# Patient Record
Sex: MALE | Race: WHITE | NOT HISPANIC OR LATINO | Employment: FULL TIME | URBAN - METROPOLITAN AREA
[De-identification: names, ages, dates, MRNs, and addresses within clinical notes are randomized per-mention and may not be internally consistent; named-entity substitution may affect disease eponyms.]

---

## 2023-04-25 DIAGNOSIS — F32.A DEPRESSION, UNSPECIFIED DEPRESSION TYPE: Primary | ICD-10-CM

## 2023-04-25 DIAGNOSIS — E78.2 MIXED HYPERLIPIDEMIA: ICD-10-CM

## 2023-04-25 RX ORDER — ATORVASTATIN CALCIUM 80 MG/1
80 TABLET, FILM COATED ORAL DAILY
COMMUNITY
End: 2023-04-25 | Stop reason: SDUPTHER

## 2023-04-25 RX ORDER — ATORVASTATIN CALCIUM 80 MG/1
80 TABLET, FILM COATED ORAL DAILY
Qty: 90 TABLET | Refills: 1 | Status: SHIPPED | OUTPATIENT
Start: 2023-04-25 | End: 2024-01-17

## 2023-04-25 RX ORDER — FLUOXETINE HYDROCHLORIDE 40 MG/1
40 CAPSULE ORAL DAILY
COMMUNITY
End: 2023-04-25 | Stop reason: SDUPTHER

## 2023-04-25 RX ORDER — FLUOXETINE HYDROCHLORIDE 40 MG/1
40 CAPSULE ORAL DAILY
Qty: 90 CAPSULE | Refills: 1 | Status: SHIPPED | OUTPATIENT
Start: 2023-04-25 | End: 2024-01-17

## 2023-04-28 PROBLEM — M25.562 ARTHRALGIA OF BOTH KNEES: Status: ACTIVE | Noted: 2023-04-28

## 2023-04-28 PROBLEM — F32.A DEPRESSION: Status: ACTIVE | Noted: 2023-04-28

## 2023-04-28 PROBLEM — M25.519 SHOULDER PAIN: Status: ACTIVE | Noted: 2023-04-28

## 2023-04-28 PROBLEM — M25.561 ARTHRALGIA OF BOTH KNEES: Status: ACTIVE | Noted: 2023-04-28

## 2023-04-28 PROBLEM — H93.8X9 EAR PRESSURE: Status: ACTIVE | Noted: 2023-04-28

## 2023-04-28 PROBLEM — J34.89 SINUS PRESSURE: Status: ACTIVE | Noted: 2023-04-28

## 2023-04-28 PROBLEM — M54.2 NECK PAIN: Status: ACTIVE | Noted: 2023-04-28

## 2023-04-28 PROBLEM — G47.33 OSA (OBSTRUCTIVE SLEEP APNEA): Status: ACTIVE | Noted: 2023-04-28

## 2023-04-28 PROBLEM — R53.83 FATIGUE: Status: ACTIVE | Noted: 2023-04-28

## 2023-04-28 PROBLEM — R97.20 ELEVATED PSA: Status: ACTIVE | Noted: 2023-04-28

## 2023-04-28 PROBLEM — M54.12 CERVICAL RADICULOPATHY: Status: ACTIVE | Noted: 2023-04-28

## 2023-04-28 PROBLEM — E78.00 HYPERCHOLESTEROLEMIA: Status: ACTIVE | Noted: 2023-04-28

## 2023-04-28 PROBLEM — M25.511 SHOULDER PAIN, RIGHT: Status: ACTIVE | Noted: 2023-04-28

## 2023-04-28 PROBLEM — E29.1 TESTICULAR HYPOFUNCTION: Status: ACTIVE | Noted: 2023-04-28

## 2023-04-28 PROBLEM — I63.9 STROKE (MULTI): Status: ACTIVE | Noted: 2023-04-28

## 2023-04-28 PROBLEM — K21.9 GERD (GASTROESOPHAGEAL REFLUX DISEASE): Status: ACTIVE | Noted: 2023-04-28

## 2023-04-28 PROBLEM — E34.9 HYPOTESTOSTERONISM: Status: ACTIVE | Noted: 2023-04-28

## 2023-04-28 PROBLEM — F10.20 ALCOHOLISM (MULTI): Status: ACTIVE | Noted: 2023-04-28

## 2023-04-28 RX ORDER — PANTOPRAZOLE SODIUM 40 MG/1
1 TABLET, DELAYED RELEASE ORAL DAILY
COMMUNITY
Start: 2023-02-10 | End: 2023-05-25 | Stop reason: SDUPTHER

## 2023-04-28 RX ORDER — NAPROXEN SODIUM 220 MG
1 TABLET ORAL DAILY
COMMUNITY
Start: 2020-05-27

## 2023-04-28 RX ORDER — FLUTICASONE PROPIONATE 50 MCG
1 SPRAY, SUSPENSION (ML) NASAL DAILY
COMMUNITY
Start: 2016-05-13 | End: 2023-05-01 | Stop reason: ALTCHOICE

## 2023-04-28 RX ORDER — TESTOSTERONE 50 MG/5G
GEL TRANSDERMAL
COMMUNITY
Start: 2014-03-20 | End: 2024-01-10 | Stop reason: SDUPTHER

## 2023-04-28 RX ORDER — SILDENAFIL 100 MG/1
TABLET, FILM COATED ORAL
COMMUNITY
Start: 2023-02-10 | End: 2024-05-02 | Stop reason: SDUPTHER

## 2023-05-01 ENCOUNTER — OFFICE VISIT (OUTPATIENT)
Dept: PRIMARY CARE | Facility: CLINIC | Age: 58
End: 2023-05-01
Payer: COMMERCIAL

## 2023-05-01 ENCOUNTER — LAB (OUTPATIENT)
Dept: LAB | Facility: LAB | Age: 58
End: 2023-05-01
Payer: COMMERCIAL

## 2023-05-01 VITALS
HEART RATE: 65 BPM | SYSTOLIC BLOOD PRESSURE: 140 MMHG | BODY MASS INDEX: 26.53 KG/M2 | RESPIRATION RATE: 16 BRPM | HEIGHT: 67 IN | DIASTOLIC BLOOD PRESSURE: 70 MMHG | OXYGEN SATURATION: 97 % | TEMPERATURE: 97.6 F | WEIGHT: 169 LBS

## 2023-05-01 DIAGNOSIS — F32.A DEPRESSION, UNSPECIFIED DEPRESSION TYPE: ICD-10-CM

## 2023-05-01 DIAGNOSIS — E29.1 TESTICULAR HYPOFUNCTION: ICD-10-CM

## 2023-05-01 DIAGNOSIS — R35.0 URINARY FREQUENCY: ICD-10-CM

## 2023-05-01 DIAGNOSIS — E29.1 HYPOGONADISM IN MALE: ICD-10-CM

## 2023-05-01 DIAGNOSIS — Z72.0 TOBACCO ABUSE: ICD-10-CM

## 2023-05-01 DIAGNOSIS — Z00.00 HEALTHCARE MAINTENANCE: ICD-10-CM

## 2023-05-01 DIAGNOSIS — Z00.00 HEALTHCARE MAINTENANCE: Primary | ICD-10-CM

## 2023-05-01 PROBLEM — M25.562 ARTHRALGIA OF BOTH KNEES: Status: RESOLVED | Noted: 2023-04-28 | Resolved: 2023-05-01

## 2023-05-01 PROBLEM — J34.89 SINUS PRESSURE: Status: RESOLVED | Noted: 2023-04-28 | Resolved: 2023-05-01

## 2023-05-01 PROBLEM — M25.519 SHOULDER PAIN: Status: RESOLVED | Noted: 2023-04-28 | Resolved: 2023-05-01

## 2023-05-01 PROBLEM — M54.2 NECK PAIN: Status: RESOLVED | Noted: 2023-04-28 | Resolved: 2023-05-01

## 2023-05-01 PROBLEM — M25.511 SHOULDER PAIN, RIGHT: Status: RESOLVED | Noted: 2023-04-28 | Resolved: 2023-05-01

## 2023-05-01 PROBLEM — H93.8X9 EAR PRESSURE: Status: RESOLVED | Noted: 2023-04-28 | Resolved: 2023-05-01

## 2023-05-01 PROBLEM — E34.9 HYPOTESTOSTERONISM: Status: RESOLVED | Noted: 2023-04-28 | Resolved: 2023-05-01

## 2023-05-01 PROBLEM — M25.561 ARTHRALGIA OF BOTH KNEES: Status: RESOLVED | Noted: 2023-04-28 | Resolved: 2023-05-01

## 2023-05-01 PROBLEM — R53.83 FATIGUE: Status: RESOLVED | Noted: 2023-04-28 | Resolved: 2023-05-01

## 2023-05-01 PROBLEM — F10.20 ALCOHOLISM (MULTI): Status: RESOLVED | Noted: 2023-04-28 | Resolved: 2023-05-01

## 2023-05-01 LAB
ALANINE AMINOTRANSFERASE (SGPT) (U/L) IN SER/PLAS: 23 U/L (ref 10–52)
ALBUMIN (G/DL) IN SER/PLAS: 4.5 G/DL (ref 3.4–5)
ALKALINE PHOSPHATASE (U/L) IN SER/PLAS: 61 U/L (ref 33–120)
ANION GAP IN SER/PLAS: 9 MMOL/L (ref 10–20)
APPEARANCE, URINE: CLEAR
ASPARTATE AMINOTRANSFERASE (SGOT) (U/L) IN SER/PLAS: 20 U/L (ref 9–39)
BASOPHILS (10*3/UL) IN BLOOD BY AUTOMATED COUNT: 0.1 X10E9/L (ref 0–0.1)
BASOPHILS/100 LEUKOCYTES IN BLOOD BY AUTOMATED COUNT: 1.8 % (ref 0–2)
BILIRUBIN TOTAL (MG/DL) IN SER/PLAS: 0.5 MG/DL (ref 0–1.2)
BILIRUBIN, URINE: NEGATIVE
BLOOD, URINE: NEGATIVE
CALCIUM (MG/DL) IN SER/PLAS: 9.7 MG/DL (ref 8.6–10.3)
CARBON DIOXIDE, TOTAL (MMOL/L) IN SER/PLAS: 29 MMOL/L (ref 21–32)
CHLORIDE (MMOL/L) IN SER/PLAS: 102 MMOL/L (ref 98–107)
CHOLESTEROL (MG/DL) IN SER/PLAS: 185 MG/DL (ref 0–199)
CHOLESTEROL IN HDL (MG/DL) IN SER/PLAS: 52.6 MG/DL
CHOLESTEROL/HDL RATIO: 3.5
COLOR, URINE: YELLOW
CREATININE (MG/DL) IN SER/PLAS: 0.89 MG/DL (ref 0.5–1.3)
EOSINOPHILS (10*3/UL) IN BLOOD BY AUTOMATED COUNT: 0.25 X10E9/L (ref 0–0.7)
EOSINOPHILS/100 LEUKOCYTES IN BLOOD BY AUTOMATED COUNT: 4.5 % (ref 0–6)
ERYTHROCYTE DISTRIBUTION WIDTH (RATIO) BY AUTOMATED COUNT: 12.7 % (ref 11.5–14.5)
ERYTHROCYTE MEAN CORPUSCULAR HEMOGLOBIN CONCENTRATION (G/DL) BY AUTOMATED: 32.3 G/DL (ref 32–36)
ERYTHROCYTE MEAN CORPUSCULAR VOLUME (FL) BY AUTOMATED COUNT: 91 FL (ref 80–100)
ERYTHROCYTES (10*6/UL) IN BLOOD BY AUTOMATED COUNT: 4.39 X10E12/L (ref 4.5–5.9)
GFR MALE: >90 ML/MIN/1.73M2
GLUCOSE (MG/DL) IN SER/PLAS: 92 MG/DL (ref 74–99)
GLUCOSE, URINE: NEGATIVE MG/DL
HEMATOCRIT (%) IN BLOOD BY AUTOMATED COUNT: 39.9 % (ref 41–52)
HEMOGLOBIN (G/DL) IN BLOOD: 12.9 G/DL (ref 13.5–17.5)
IMMATURE GRANULOCYTES/100 LEUKOCYTES IN BLOOD BY AUTOMATED COUNT: 0.2 % (ref 0–0.9)
KETONES, URINE: NEGATIVE MG/DL
LDL: 107 MG/DL (ref 0–99)
LEUKOCYTE ESTERASE, URINE: NEGATIVE
LEUKOCYTES (10*3/UL) IN BLOOD BY AUTOMATED COUNT: 5.6 X10E9/L (ref 4.4–11.3)
LYMPHOCYTES (10*3/UL) IN BLOOD BY AUTOMATED COUNT: 1.64 X10E9/L (ref 1.2–4.8)
LYMPHOCYTES/100 LEUKOCYTES IN BLOOD BY AUTOMATED COUNT: 29.5 % (ref 13–44)
MONOCYTES (10*3/UL) IN BLOOD BY AUTOMATED COUNT: 0.5 X10E9/L (ref 0.1–1)
MONOCYTES/100 LEUKOCYTES IN BLOOD BY AUTOMATED COUNT: 9 % (ref 2–10)
NEUTROPHILS (10*3/UL) IN BLOOD BY AUTOMATED COUNT: 3.05 X10E9/L (ref 1.2–7.7)
NEUTROPHILS/100 LEUKOCYTES IN BLOOD BY AUTOMATED COUNT: 55 % (ref 40–80)
NITRITE, URINE: NEGATIVE
PH, URINE: 6 (ref 5–8)
PLATELETS (10*3/UL) IN BLOOD AUTOMATED COUNT: 166 X10E9/L (ref 150–450)
POTASSIUM (MMOL/L) IN SER/PLAS: 4 MMOL/L (ref 3.5–5.3)
PROSTATE SPECIFIC AG (NG/ML) IN SER/PLAS: 1.95 NG/ML (ref 0–4)
PROTEIN TOTAL: 7.6 G/DL (ref 6.4–8.2)
PROTEIN, URINE: NEGATIVE MG/DL
SODIUM (MMOL/L) IN SER/PLAS: 136 MMOL/L (ref 136–145)
SPECIFIC GRAVITY, URINE: 1.02 (ref 1–1.03)
TRIGLYCERIDE (MG/DL) IN SER/PLAS: 126 MG/DL (ref 0–149)
UREA NITROGEN (MG/DL) IN SER/PLAS: 16 MG/DL (ref 6–23)
UROBILINOGEN, URINE: <2 MG/DL (ref 0–1.9)
VLDL: 25 MG/DL (ref 0–40)

## 2023-05-01 PROCEDURE — 84403 ASSAY OF TOTAL TESTOSTERONE: CPT

## 2023-05-01 PROCEDURE — 85025 COMPLETE CBC W/AUTO DIFF WBC: CPT

## 2023-05-01 PROCEDURE — 84402 ASSAY OF FREE TESTOSTERONE: CPT

## 2023-05-01 PROCEDURE — 99396 PREV VISIT EST AGE 40-64: CPT | Performed by: INTERNAL MEDICINE

## 2023-05-01 PROCEDURE — 80061 LIPID PANEL: CPT

## 2023-05-01 PROCEDURE — 93000 ELECTROCARDIOGRAM COMPLETE: CPT | Performed by: INTERNAL MEDICINE

## 2023-05-01 PROCEDURE — 81003 URINALYSIS AUTO W/O SCOPE: CPT

## 2023-05-01 PROCEDURE — 80053 COMPREHEN METABOLIC PANEL: CPT

## 2023-05-01 PROCEDURE — 84153 ASSAY OF PSA TOTAL: CPT

## 2023-05-01 PROCEDURE — 36415 COLL VENOUS BLD VENIPUNCTURE: CPT

## 2023-05-01 RX ORDER — TESTOSTERONE 50 MG/5G
50 GEL TRANSDERMAL
Qty: 2 PACKET | Refills: 1 | Status: CANCELLED | OUTPATIENT
Start: 2023-05-01

## 2023-05-01 RX ORDER — BUPROPION HYDROCHLORIDE 150 MG/1
150 TABLET, EXTENDED RELEASE ORAL 2 TIMES DAILY
Qty: 60 TABLET | Refills: 1 | Status: SHIPPED | OUTPATIENT
Start: 2023-05-01 | End: 2024-05-02 | Stop reason: ALTCHOICE

## 2023-05-01 ASSESSMENT — ENCOUNTER SYMPTOMS
CONSTIPATION: 0
ACTIVITY CHANGE: 1
DIFFICULTY URINATING: 1
SHORTNESS OF BREATH: 0
FREQUENCY: 1
DIARRHEA: 0

## 2023-05-01 ASSESSMENT — PATIENT HEALTH QUESTIONNAIRE - PHQ9
SUM OF ALL RESPONSES TO PHQ9 QUESTIONS 1 AND 2: 0
2. FEELING DOWN, DEPRESSED OR HOPELESS: NOT AT ALL
1. LITTLE INTEREST OR PLEASURE IN DOING THINGS: NOT AT ALL

## 2023-05-01 NOTE — PROGRESS NOTES
Patient here for a physical - declined chaperone     Subjective   Patient ID: Pancho King is a 58 y.o. male who presents for Annual Exam.    The patient reports he is trying to cut down on cigarettes, but finds himself consuming more sugar in candy and cough drops instead.  He notes ongoing sinus and nasal congestion which he believes is related to smoking.  He has tried fluticasone, but discontinued as it was not helping.  The patient denies dyspnea.    The patient reports notices urinary frequency and mild urinary incontinence at night time, particularly after consuming beer or coffee.  He drinks about one 12 pack of beer on the weekends, but otherwise rarely drinks during the week.    The patient continues to take Androgel 50mg/5gm, although inconsistently.  He notes fatigue and low energy, particularly when he is not taking this medication.     The patient denies any bowel problems.        Review of Systems   Constitutional:  Positive for activity change.   HENT:  Positive for congestion.    Respiratory:  Negative for shortness of breath.    Gastrointestinal:  Negative for constipation and diarrhea.   Genitourinary:  Positive for difficulty urinating and frequency.        Positive for mild urinary incontinence.       Objective   Physical Exam  Constitutional:       Appearance: Normal appearance.   Cardiovascular:      Rate and Rhythm: Normal rate and regular rhythm.      Heart sounds: Normal heart sounds.   Pulmonary:      Effort: Pulmonary effort is normal.      Breath sounds: Normal breath sounds.   Abdominal:      General: Bowel sounds are normal.      Palpations: Abdomen is soft.      Tenderness: There is no abdominal tenderness.   Skin:     General: Skin is warm and dry.   Neurological:      General: No focal deficit present.      Mental Status: He is alert and oriented to person, place, and time. Mental status is at baseline.   Psychiatric:         Mood and Affect: Mood normal.         Behavior:  Behavior normal.       Assessment/Plan       CPE Performed  -  Discussed healthy diet and regular exercise.    -  Physical exam overall unremarkable. Immunizations reviewed and updated accordingly. Healthy lifestyle choices discussed (tobacco avoidance, appropriate alcohol use, avoidance of illicit substances).   -  Patient is wearing seatbelt.   -  Screening lab work ordered as indicated.    -  Age appropriate screening tests reviewed with patient.       IMPRESSIONS/PLAN:     Depression   - Stable. Start bupropion 150mg XL QD and advised patient that drinking alcohol will lower seizure threshold and he verbalized understanding.  Continue on fluoxetine 40mg QD, report any changes in mood or side effects. Advised patient to continue taking this medication for a little longer. If worsening depression does not subside, will increase to 60 mg.    Urinary Frequency  -Ordered Urinalysis with reflex microscopy.    Tobacco Use  - Prescribed bupropion as above to assist with smoking cessation.  Ordered Low Dose CT Chest for lung screening.    /70 in the office today.    Hypercholesterolemia  - Continue on atorvastatin 80mg QD.      Testicular Hypofunction  - Continue on Androgel 50mg/5gm as prescribed.  Ordered testosterone free and total.    GERD   - Continue on pantoprazole 40mg QD as needed. Last EGD 10/2022.     ED   - Continue on sildenafil 100mg 1 hour before sexual activity.      History TIA   - Stable.      Health Maintenance   - Routine labs ordered including CBC, CMP, and a lipid panel to be completed in the fasting state.  Added PSA. Last colonoscopy 10/2022, due for repeat 10/2027.     Follow up for annual physical examination, call sooner if needed.         Scribe Attestation  By signing my name below, IZuri Scribe   attest that this documentation has been prepared under the direction and in the presence of Mark Franklin DO.

## 2023-05-04 DIAGNOSIS — Z00.00 HEALTHCARE MAINTENANCE: Primary | ICD-10-CM

## 2023-05-11 ENCOUNTER — LAB (OUTPATIENT)
Dept: LAB | Facility: LAB | Age: 58
End: 2023-05-11
Payer: COMMERCIAL

## 2023-05-11 DIAGNOSIS — Z00.00 HEALTHCARE MAINTENANCE: ICD-10-CM

## 2023-05-11 LAB
BASOPHILS (10*3/UL) IN BLOOD BY AUTOMATED COUNT: 0.07 X10E9/L (ref 0–0.1)
BASOPHILS/100 LEUKOCYTES IN BLOOD BY AUTOMATED COUNT: 1.8 % (ref 0–2)
EOSINOPHILS (10*3/UL) IN BLOOD BY AUTOMATED COUNT: 0.16 X10E9/L (ref 0–0.7)
EOSINOPHILS/100 LEUKOCYTES IN BLOOD BY AUTOMATED COUNT: 4.1 % (ref 0–6)
ERYTHROCYTE DISTRIBUTION WIDTH (RATIO) BY AUTOMATED COUNT: 12.8 % (ref 11.5–14.5)
ERYTHROCYTE MEAN CORPUSCULAR HEMOGLOBIN CONCENTRATION (G/DL) BY AUTOMATED: 32.2 G/DL (ref 32–36)
ERYTHROCYTE MEAN CORPUSCULAR VOLUME (FL) BY AUTOMATED COUNT: 92 FL (ref 80–100)
ERYTHROCYTES (10*6/UL) IN BLOOD BY AUTOMATED COUNT: 4.37 X10E12/L (ref 4.5–5.9)
HEMATOCRIT (%) IN BLOOD BY AUTOMATED COUNT: 40.4 % (ref 41–52)
HEMOGLOBIN (G/DL) IN BLOOD: 13 G/DL (ref 13.5–17.5)
IMMATURE GRANULOCYTES/100 LEUKOCYTES IN BLOOD BY AUTOMATED COUNT: 0 % (ref 0–0.9)
LEUKOCYTES (10*3/UL) IN BLOOD BY AUTOMATED COUNT: 3.9 X10E9/L (ref 4.4–11.3)
LYMPHOCYTES (10*3/UL) IN BLOOD BY AUTOMATED COUNT: 0.97 X10E9/L (ref 1.2–4.8)
LYMPHOCYTES/100 LEUKOCYTES IN BLOOD BY AUTOMATED COUNT: 25.1 % (ref 13–44)
MONOCYTES (10*3/UL) IN BLOOD BY AUTOMATED COUNT: 0.4 X10E9/L (ref 0.1–1)
MONOCYTES/100 LEUKOCYTES IN BLOOD BY AUTOMATED COUNT: 10.3 % (ref 2–10)
NEUTROPHILS (10*3/UL) IN BLOOD BY AUTOMATED COUNT: 2.27 X10E9/L (ref 1.2–7.7)
NEUTROPHILS/100 LEUKOCYTES IN BLOOD BY AUTOMATED COUNT: 58.7 % (ref 40–80)
PLATELETS (10*3/UL) IN BLOOD AUTOMATED COUNT: 165 X10E9/L (ref 150–450)
TESTOSTERONE FREE (CHAN): 62.4 PG/ML (ref 35–155)
TESTOSTERONE,TOTAL,LC-MS/MS: 430 NG/DL (ref 250–1100)

## 2023-05-11 PROCEDURE — 85025 COMPLETE CBC W/AUTO DIFF WBC: CPT

## 2023-05-11 PROCEDURE — 36415 COLL VENOUS BLD VENIPUNCTURE: CPT

## 2023-05-18 DIAGNOSIS — Z00.00 HEALTHCARE MAINTENANCE: Primary | ICD-10-CM

## 2023-05-25 DIAGNOSIS — K21.9 GASTROESOPHAGEAL REFLUX DISEASE WITHOUT ESOPHAGITIS: Primary | ICD-10-CM

## 2023-05-25 RX ORDER — PANTOPRAZOLE SODIUM 40 MG/1
40 TABLET, DELAYED RELEASE ORAL DAILY
Qty: 90 TABLET | Refills: 2 | Status: SHIPPED | OUTPATIENT
Start: 2023-05-25 | End: 2024-03-14

## 2023-06-16 ENCOUNTER — LAB (OUTPATIENT)
Dept: LAB | Facility: LAB | Age: 58
End: 2023-06-16
Payer: COMMERCIAL

## 2023-06-16 DIAGNOSIS — Z00.00 HEALTHCARE MAINTENANCE: ICD-10-CM

## 2023-06-16 LAB
BASOPHILS (10*3/UL) IN BLOOD BY AUTOMATED COUNT: 0.06 X10E9/L (ref 0–0.1)
BASOPHILS/100 LEUKOCYTES IN BLOOD BY AUTOMATED COUNT: 1.1 % (ref 0–2)
EOSINOPHILS (10*3/UL) IN BLOOD BY AUTOMATED COUNT: 0.15 X10E9/L (ref 0–0.7)
EOSINOPHILS/100 LEUKOCYTES IN BLOOD BY AUTOMATED COUNT: 2.7 % (ref 0–6)
ERYTHROCYTE DISTRIBUTION WIDTH (RATIO) BY AUTOMATED COUNT: 13.3 % (ref 11.5–14.5)
ERYTHROCYTE MEAN CORPUSCULAR HEMOGLOBIN CONCENTRATION (G/DL) BY AUTOMATED: 32.1 G/DL (ref 32–36)
ERYTHROCYTE MEAN CORPUSCULAR VOLUME (FL) BY AUTOMATED COUNT: 90 FL (ref 80–100)
ERYTHROCYTES (10*6/UL) IN BLOOD BY AUTOMATED COUNT: 4.37 X10E12/L (ref 4.5–5.9)
HEMATOCRIT (%) IN BLOOD BY AUTOMATED COUNT: 39.2 % (ref 41–52)
HEMOGLOBIN (G/DL) IN BLOOD: 12.6 G/DL (ref 13.5–17.5)
IMMATURE GRANULOCYTES/100 LEUKOCYTES IN BLOOD BY AUTOMATED COUNT: 0.4 % (ref 0–0.9)
LEUKOCYTES (10*3/UL) IN BLOOD BY AUTOMATED COUNT: 5.7 X10E9/L (ref 4.4–11.3)
LYMPHOCYTES (10*3/UL) IN BLOOD BY AUTOMATED COUNT: 1.62 X10E9/L (ref 1.2–4.8)
LYMPHOCYTES/100 LEUKOCYTES IN BLOOD BY AUTOMATED COUNT: 28.6 % (ref 13–44)
MONOCYTES (10*3/UL) IN BLOOD BY AUTOMATED COUNT: 0.69 X10E9/L (ref 0.1–1)
MONOCYTES/100 LEUKOCYTES IN BLOOD BY AUTOMATED COUNT: 12.2 % (ref 2–10)
NEUTROPHILS (10*3/UL) IN BLOOD BY AUTOMATED COUNT: 3.12 X10E9/L (ref 1.2–7.7)
NEUTROPHILS/100 LEUKOCYTES IN BLOOD BY AUTOMATED COUNT: 55 % (ref 40–80)
PLATELETS (10*3/UL) IN BLOOD AUTOMATED COUNT: 146 X10E9/L (ref 150–450)

## 2023-06-16 PROCEDURE — 85025 COMPLETE CBC W/AUTO DIFF WBC: CPT

## 2023-06-16 PROCEDURE — 36415 COLL VENOUS BLD VENIPUNCTURE: CPT

## 2023-06-20 DIAGNOSIS — D64.9 ANEMIA, UNSPECIFIED TYPE: Primary | ICD-10-CM

## 2024-01-10 DIAGNOSIS — E29.1 HYPOGONADISM IN MALE: Primary | ICD-10-CM

## 2024-01-10 RX ORDER — TESTOSTERONE 50 MG/5G
50 GEL TRANSDERMAL
Qty: 5 G | Refills: 1 | Status: SHIPPED | OUTPATIENT
Start: 2024-01-10 | End: 2024-05-02 | Stop reason: SDUPTHER

## 2024-01-11 ENCOUNTER — TELEPHONE (OUTPATIENT)
Dept: PRIMARY CARE | Facility: CLINIC | Age: 59
End: 2024-01-11
Payer: COMMERCIAL

## 2024-01-11 DIAGNOSIS — E29.1 HYPOGONADISM IN MALE: ICD-10-CM

## 2024-01-11 NOTE — TELEPHONE ENCOUNTER
Patient testosterone was only sent for 5 g which is just one tube. Pharmacy is asking for the correct quaintly to be called in for the patient

## 2024-01-17 DIAGNOSIS — E78.2 MIXED HYPERLIPIDEMIA: ICD-10-CM

## 2024-01-17 DIAGNOSIS — F32.A DEPRESSION, UNSPECIFIED DEPRESSION TYPE: ICD-10-CM

## 2024-01-17 RX ORDER — FLUOXETINE HYDROCHLORIDE 40 MG/1
40 CAPSULE ORAL DAILY
Qty: 90 CAPSULE | Refills: 0 | Status: SHIPPED | OUTPATIENT
Start: 2024-01-17 | End: 2024-06-03

## 2024-01-17 RX ORDER — ATORVASTATIN CALCIUM 80 MG/1
80 TABLET, FILM COATED ORAL DAILY
Qty: 90 TABLET | Refills: 0 | Status: SHIPPED | OUTPATIENT
Start: 2024-01-17 | End: 2024-06-03

## 2024-03-14 DIAGNOSIS — K21.9 GASTROESOPHAGEAL REFLUX DISEASE WITHOUT ESOPHAGITIS: ICD-10-CM

## 2024-03-14 RX ORDER — PANTOPRAZOLE SODIUM 40 MG/1
40 TABLET, DELAYED RELEASE ORAL DAILY
Qty: 90 TABLET | Refills: 0 | Status: SHIPPED | OUTPATIENT
Start: 2024-03-14

## 2024-04-19 DIAGNOSIS — Z00.00 HEALTHCARE MAINTENANCE: Primary | ICD-10-CM

## 2024-04-29 ENCOUNTER — LAB (OUTPATIENT)
Dept: LAB | Facility: LAB | Age: 59
End: 2024-04-29
Payer: COMMERCIAL

## 2024-04-29 DIAGNOSIS — Z00.00 HEALTHCARE MAINTENANCE: ICD-10-CM

## 2024-04-29 LAB
ALBUMIN SERPL BCP-MCNC: 4.1 G/DL (ref 3.4–5)
ALP SERPL-CCNC: 68 U/L (ref 33–136)
ALT SERPL W P-5'-P-CCNC: 27 U/L (ref 10–52)
ANION GAP SERPL CALC-SCNC: 14 MMOL/L (ref 10–20)
AST SERPL W P-5'-P-CCNC: 21 U/L (ref 9–39)
BASOPHILS # BLD AUTO: 0.08 X10*3/UL (ref 0–0.1)
BASOPHILS NFR BLD AUTO: 1.7 %
BILIRUB SERPL-MCNC: 0.5 MG/DL (ref 0–1.2)
BUN SERPL-MCNC: 15 MG/DL (ref 6–23)
CALCIUM SERPL-MCNC: 9.6 MG/DL (ref 8.6–10.6)
CHLORIDE SERPL-SCNC: 103 MMOL/L (ref 98–107)
CHOLEST SERPL-MCNC: 165 MG/DL (ref 0–199)
CHOLESTEROL/HDL RATIO: 4.1
CO2 SERPL-SCNC: 27 MMOL/L (ref 21–32)
CREAT SERPL-MCNC: 0.93 MG/DL (ref 0.5–1.3)
EGFRCR SERPLBLD CKD-EPI 2021: >90 ML/MIN/1.73M*2
EOSINOPHIL # BLD AUTO: 0.19 X10*3/UL (ref 0–0.7)
EOSINOPHIL NFR BLD AUTO: 4 %
ERYTHROCYTE [DISTWIDTH] IN BLOOD BY AUTOMATED COUNT: 13.2 % (ref 11.5–14.5)
GLUCOSE SERPL-MCNC: 113 MG/DL (ref 74–99)
HCT VFR BLD AUTO: 40.4 % (ref 41–52)
HDLC SERPL-MCNC: 40.6 MG/DL
HGB BLD-MCNC: 13.3 G/DL (ref 13.5–17.5)
IMM GRANULOCYTES # BLD AUTO: 0.02 X10*3/UL (ref 0–0.7)
IMM GRANULOCYTES NFR BLD AUTO: 0.4 % (ref 0–0.9)
LDLC SERPL CALC-MCNC: 93 MG/DL
LYMPHOCYTES # BLD AUTO: 1.92 X10*3/UL (ref 1.2–4.8)
LYMPHOCYTES NFR BLD AUTO: 40 %
MCH RBC QN AUTO: 30 PG (ref 26–34)
MCHC RBC AUTO-ENTMCNC: 32.9 G/DL (ref 32–36)
MCV RBC AUTO: 91 FL (ref 80–100)
MONOCYTES # BLD AUTO: 0.55 X10*3/UL (ref 0.1–1)
MONOCYTES NFR BLD AUTO: 11.5 %
NEUTROPHILS # BLD AUTO: 2.04 X10*3/UL (ref 1.2–7.7)
NEUTROPHILS NFR BLD AUTO: 42.4 %
NON HDL CHOLESTEROL: 124 MG/DL (ref 0–149)
NRBC BLD-RTO: 0 /100 WBCS (ref 0–0)
PLATELET # BLD AUTO: 183 X10*3/UL (ref 150–450)
POTASSIUM SERPL-SCNC: 4.3 MMOL/L (ref 3.5–5.3)
PROT SERPL-MCNC: 7.2 G/DL (ref 6.4–8.2)
PSA SERPL-MCNC: 1.5 NG/ML
RBC # BLD AUTO: 4.43 X10*6/UL (ref 4.5–5.9)
SODIUM SERPL-SCNC: 140 MMOL/L (ref 136–145)
TRIGL SERPL-MCNC: 158 MG/DL (ref 0–149)
VLDL: 32 MG/DL (ref 0–40)
WBC # BLD AUTO: 4.8 X10*3/UL (ref 4.4–11.3)

## 2024-04-29 PROCEDURE — 84402 ASSAY OF FREE TESTOSTERONE: CPT

## 2024-04-29 PROCEDURE — 85025 COMPLETE CBC W/AUTO DIFF WBC: CPT

## 2024-04-29 PROCEDURE — 80061 LIPID PANEL: CPT

## 2024-04-29 PROCEDURE — 80053 COMPREHEN METABOLIC PANEL: CPT

## 2024-04-29 PROCEDURE — 36415 COLL VENOUS BLD VENIPUNCTURE: CPT

## 2024-04-29 PROCEDURE — 84153 ASSAY OF PSA TOTAL: CPT

## 2024-05-02 ENCOUNTER — OFFICE VISIT (OUTPATIENT)
Dept: PRIMARY CARE | Facility: CLINIC | Age: 59
End: 2024-05-02
Payer: COMMERCIAL

## 2024-05-02 VITALS
WEIGHT: 173 LBS | HEART RATE: 75 BPM | HEIGHT: 67 IN | BODY MASS INDEX: 27.15 KG/M2 | SYSTOLIC BLOOD PRESSURE: 130 MMHG | TEMPERATURE: 97.9 F | DIASTOLIC BLOOD PRESSURE: 90 MMHG | RESPIRATION RATE: 16 BRPM | OXYGEN SATURATION: 98 %

## 2024-05-02 DIAGNOSIS — R06.83 SNORING: ICD-10-CM

## 2024-05-02 DIAGNOSIS — E29.1 HYPOGONADISM IN MALE: ICD-10-CM

## 2024-05-02 DIAGNOSIS — Z72.0 TOBACCO ABUSE: ICD-10-CM

## 2024-05-02 DIAGNOSIS — E78.00 HYPERCHOLESTEROLEMIA: Primary | ICD-10-CM

## 2024-05-02 PROCEDURE — 93000 ELECTROCARDIOGRAM COMPLETE: CPT | Performed by: INTERNAL MEDICINE

## 2024-05-02 PROCEDURE — 99396 PREV VISIT EST AGE 40-64: CPT | Performed by: INTERNAL MEDICINE

## 2024-05-02 RX ORDER — TESTOSTERONE 50 MG/5G
50 GEL TRANSDERMAL
Qty: 5 G | Refills: 1 | Status: SHIPPED | OUTPATIENT
Start: 2024-05-02

## 2024-05-02 RX ORDER — SILDENAFIL 100 MG/1
TABLET, FILM COATED ORAL
Qty: 10 TABLET | Refills: 1 | Status: SHIPPED | OUTPATIENT
Start: 2024-05-02

## 2024-05-02 ASSESSMENT — ENCOUNTER SYMPTOMS
ABDOMINAL PAIN: 0
DIARRHEA: 0
SLEEP DISTURBANCE: 1
FREQUENCY: 0
CONSTIPATION: 0
BACK PAIN: 1

## 2024-05-02 ASSESSMENT — PATIENT HEALTH QUESTIONNAIRE - PHQ9
2. FEELING DOWN, DEPRESSED OR HOPELESS: NOT AT ALL
SUM OF ALL RESPONSES TO PHQ9 QUESTIONS 1 AND 2: 0
1. LITTLE INTEREST OR PLEASURE IN DOING THINGS: NOT AT ALL

## 2024-05-02 NOTE — PROGRESS NOTES
Patient here for a physical - declined chaperone     Subjective   Patient ID: Pancho King is a 59 y.o. male who presents for Annual Exam.  He is accompanied by his Mother-In-Law who offers some of the history.    The patient reports intermittent shoulder pain and back pain.      The patient has been taking atorvastatin 80mg once daily consistently, and is tolerating the medication well.    The patient continues to take Androgel 50mg/5gm as prescribed.    The patient has stopped taking bupropion 150mg twice daily due to lack of benefit.  He continues to take fluoxetine 40mg once daily, and feels that this medication is sufficient.      The patient plans to start taking a multivitamin for supplementation of iron.  The last hemoglobin was low but stable at 13.3 g/dL in 4/2024.    The patient reports worsening visual acquity and suspects he needs new prescription lenses.  He otherwise denies vision changes..  The patient reports poor to average sleep quality due to his cats.  He denies any abdominal pain, bowel problems, or significant urinary symptoms.       Review of Systems   Eyes:  Negative for visual disturbance.   Gastrointestinal:  Negative for abdominal pain, constipation and diarrhea.   Genitourinary:  Negative for frequency.   Musculoskeletal:  Positive for back pain.        Positive for shoulder pain.   Psychiatric/Behavioral:  Positive for sleep disturbance.      Objective   Physical Exam  Constitutional:       Appearance: Normal appearance.   Neck:      Vascular: No carotid bruit.   Cardiovascular:      Rate and Rhythm: Normal rate and regular rhythm.      Heart sounds: Normal heart sounds.   Pulmonary:      Effort: Pulmonary effort is normal.      Breath sounds: Normal breath sounds.   Abdominal:      General: Bowel sounds are normal.      Palpations: Abdomen is soft.      Tenderness: There is no abdominal tenderness.   Skin:     General: Skin is warm and dry.   Neurological:      General: No focal  deficit present.      Mental Status: He is alert and oriented to person, place, and time. Mental status is at baseline.   Psychiatric:         Mood and Affect: Mood normal.         Behavior: Behavior normal.         Assessment/Plan   Problem List Items Addressed This Visit             ICD-10-CM    Hypercholesterolemia - Primary E78.00    Relevant Orders    ECG 12 lead (Clinic Performed) (Completed)    Hypogonadism in male E29.1    Relevant Medications    testosterone 50 mg/5 gram (1 %) gel    sildenafil (Viagra) 100 mg tablet    Tobacco abuse Z72.0    Relevant Orders    CT lung screening low dose     Other Visit Diagnoses         Codes    Snoring     R06.83    Relevant Orders    Home sleep apnea test (HSAT)            CPE Performed  -  Discussed healthy diet and regular exercise.    -  Physical exam overall unremarkable. Immunizations reviewed and updated accordingly. Healthy lifestyle choices discussed (tobacco avoidance, appropriate alcohol use, avoidance of illicit substances).   -  Patient is wearing seatbelt.   -  Screening lab work ordered as indicated.    -  Age appropriate screening tests reviewed with patient.        EKG unremarkable compared to previous.    IMPRESSIONS/PLAN:      Tobacco Use  - Maintained with bupropion 150mg twice daily. CT Lung Screening 5/2023 showed no suspicious pulmonary nodules.  Ordered repeat for CT Lung Screening.    Possible Sleep Apnea  - Ordered Sleep Study.    /80 in the office today, 130/90 on repeat.  Patient had coffee this morning.  Instructed patient regarding proper measuring protocol.  Advised patient to check blood pressure at home regularly, and call the clinic if persistently elevated above 140/90 mmHg.     Hypercholesterolemia  - TG borderline high 4/2024.  Continue on atorvastatin 80mg QD.      Depression   - Stable. Maintained with fluoxetine 40mg every day.  If worsening depression does not subside, will increase to 60 mg.  Previously on bupropion 150mg XL  BID.    Testicular Hypofunction  - Continue on Androgel 50mg/5gm as prescribed.  Testosterone free and total results pending.     GERD   - Continue on pantoprazole 40mg QD as needed. Last EGD 10/2022.     ED   - Continue on sildenafil 100mg 1 hour before sexual activity.      History TIA   - Stable.      Health Maintenance   - Routine labs 4/2024.  Last PSA wnl 4/2024. Last colonoscopy 10/2022, due for repeat 10/2027.  Patient has personal history of serrated polyposis syndrome.     Follow up for annual physical examination, call sooner if needed.        Scribe Attestation  By signing my name below, I, Ravi Dalton   attest that this documentation has been prepared under the direction and in the presence of Mark Franklin DO.   Zuri Ahn 05/02/24 8:07 AM

## 2024-05-03 LAB
TESTOSTERONE FREE (CHAN): 65 PG/ML (ref 35–155)
TESTOSTERONE,TOTAL,LC-MS/MS: 394 NG/DL (ref 250–1100)

## 2024-05-28 ENCOUNTER — HOSPITAL ENCOUNTER (OUTPATIENT)
Dept: RADIOLOGY | Facility: CLINIC | Age: 59
Discharge: HOME | End: 2024-05-28
Payer: COMMERCIAL

## 2024-05-28 DIAGNOSIS — Z72.0 TOBACCO ABUSE: ICD-10-CM

## 2024-05-28 PROCEDURE — 71271 CT THORAX LUNG CANCER SCR C-: CPT

## 2024-06-03 DIAGNOSIS — F32.A DEPRESSION, UNSPECIFIED DEPRESSION TYPE: ICD-10-CM

## 2024-06-03 DIAGNOSIS — E78.2 MIXED HYPERLIPIDEMIA: ICD-10-CM

## 2024-06-03 RX ORDER — FLUOXETINE HYDROCHLORIDE 40 MG/1
40 CAPSULE ORAL DAILY
Qty: 90 CAPSULE | Refills: 2 | Status: SHIPPED | OUTPATIENT
Start: 2024-06-03

## 2024-06-03 RX ORDER — ATORVASTATIN CALCIUM 80 MG/1
80 TABLET, FILM COATED ORAL DAILY
Qty: 90 TABLET | Refills: 2 | Status: SHIPPED | OUTPATIENT
Start: 2024-06-03

## 2024-07-02 DIAGNOSIS — K21.9 GASTROESOPHAGEAL REFLUX DISEASE WITHOUT ESOPHAGITIS: ICD-10-CM

## 2024-07-02 RX ORDER — PANTOPRAZOLE SODIUM 40 MG/1
40 TABLET, DELAYED RELEASE ORAL DAILY
Qty: 90 TABLET | Refills: 2 | Status: SHIPPED | OUTPATIENT
Start: 2024-07-02

## 2025-01-26 DIAGNOSIS — E29.1 HYPOGONADISM IN MALE: ICD-10-CM

## 2025-01-27 RX ORDER — TESTOSTERONE GEL, 1% 10 MG/G
GEL TRANSDERMAL
Qty: 150 G | Refills: 0 | Status: SHIPPED | OUTPATIENT
Start: 2025-01-27

## 2025-02-03 DIAGNOSIS — E29.1 HYPOGONADISM IN MALE: ICD-10-CM

## 2025-02-03 RX ORDER — TESTOSTERONE 50 MG/5G
GEL TRANSDERMAL
Qty: 5 G | Refills: 1 | Status: SHIPPED | OUTPATIENT
Start: 2025-02-03

## 2025-02-19 DIAGNOSIS — K21.9 GASTROESOPHAGEAL REFLUX DISEASE WITHOUT ESOPHAGITIS: ICD-10-CM

## 2025-02-20 RX ORDER — PANTOPRAZOLE SODIUM 40 MG/1
40 TABLET, DELAYED RELEASE ORAL DAILY
Qty: 90 TABLET | Refills: 3 | Status: SHIPPED | OUTPATIENT
Start: 2025-02-20

## 2025-02-21 DIAGNOSIS — E29.1 HYPOGONADISM IN MALE: ICD-10-CM

## 2025-02-21 RX ORDER — TESTOSTERONE 50 MG/5G
GEL TRANSDERMAL
Qty: 150 G | Refills: 1 | Status: SHIPPED | OUTPATIENT
Start: 2025-02-21

## 2025-02-26 ENCOUNTER — APPOINTMENT (OUTPATIENT)
Dept: PRIMARY CARE | Facility: CLINIC | Age: 60
End: 2025-02-26
Payer: COMMERCIAL

## 2025-02-26 VITALS
SYSTOLIC BLOOD PRESSURE: 138 MMHG | TEMPERATURE: 97.3 F | DIASTOLIC BLOOD PRESSURE: 70 MMHG | WEIGHT: 178 LBS | HEART RATE: 76 BPM | BODY MASS INDEX: 27.88 KG/M2 | OXYGEN SATURATION: 98 % | RESPIRATION RATE: 16 BRPM

## 2025-02-26 DIAGNOSIS — Z72.0 TOBACCO ABUSE: ICD-10-CM

## 2025-02-26 DIAGNOSIS — Z00.00 HEALTHCARE MAINTENANCE: Primary | ICD-10-CM

## 2025-02-26 PROCEDURE — 99214 OFFICE O/P EST MOD 30 MIN: CPT | Performed by: INTERNAL MEDICINE

## 2025-02-26 ASSESSMENT — ENCOUNTER SYMPTOMS: HEADACHES: 1

## 2025-02-26 NOTE — PROGRESS NOTES
Patient here for a follow up on medication refill     Subjective   Patient ID: Pancho King is a 60 y.o. male who presents for Follow-up.    The patient inquires whether a lower dosage of sildenafil 100mg would be more helpful, as he is experiencing headaches with the current dosage.     The patient is following with  from Psychology at [facility], and finds that this is helping.    The patient stopped smoking cigarettes years prior, but continues to have the occasional cigar.      Review of Systems   Genitourinary:         Positive for erectile dysfunction.   Neurological:  Positive for headaches.   All other systems reviewed and are negative.      Objective   Physical Exam  Constitutional:       Appearance: Normal appearance.   Neck:      Vascular: No carotid bruit.   Cardiovascular:      Rate and Rhythm: Normal rate and regular rhythm.      Heart sounds: Normal heart sounds.   Pulmonary:      Effort: Pulmonary effort is normal.      Breath sounds: Normal breath sounds.   Abdominal:      General: Bowel sounds are normal.      Palpations: Abdomen is soft.      Tenderness: There is no abdominal tenderness.   Skin:     General: Skin is warm and dry.   Neurological:      General: No focal deficit present.      Mental Status: He is alert and oriented to person, place, and time. Mental status is at baseline.   Psychiatric:         Mood and Affect: Mood normal.         Behavior: Behavior normal.       Assessment/Plan   Problem List Items Addressed This Visit             ICD-10-CM    Tobacco abuse Z72.0    Relevant Orders    CT lung screening low dose    Healthcare maintenance - Primary Z00.00    Relevant Orders    Lipid panel    CBC and Auto Differential    Comprehensive metabolic panel    PSA    Testosterone, total and free       IMPRESSIONS/PLAN:     Tobacco Use  - CT Lung Screening 5/2023 showed no suspicious pulmonary nodules.  Stable per repeat CT Lung Screening 5/2024.  Ordered repeat CT Lung  Screening for 2025.    Testicular Hypofunction  - Continue on Androgel 50mg/5gm as prescribed.  Ordered Testosterone free and total.    /70 in the office today.  Patient had coffee this morning.  Instructed patient regarding proper measuring protocol.  Advised patient to check blood pressure at home regularly, and call the clinic if persistently elevated above 140/90 mmHg.     Hypercholesterolemia  - TG borderline high 4/2024.  Continue on atorvastatin 80mg QD.      Depression   - Stable. Maintained with fluoxetine 40mg every day.  If worsening depression does not subside, will increase to 60 mg.  Previously on bupropion 150mg XL BID.     History TIA   - Stable.     Possible Sleep Apnea  - Previously ordered Sleep Study.    GERD   - Continue on pantoprazole 40mg QD as needed. Last EGD 10/2022.    ED   - Continue on sildenafil 100mg 1 hour before sexual activity.  Advised patient to reduce dosage to 0.5 tablet, and he verbalized agreement.       Health Maintenance   - Routine labs ordered including CBC, CMP, and a lipid panel to be completed in the fasting state. Added PSA. Last PSA wnl 4/2024. Last colonoscopy 10/2022, due for repeat 10/2027.  Patient has personal history of serrated polyposis syndrome.     Follow-up according to routine health maintenance, call sooner if needed.        Scribe Attestation  By signing my name below, I, Ravi Dalton   attest that this documentation has been prepared under the direction and in the presence of Mark Franklin DO.   Zuri Ahn 02/26/25 2:53 PM

## 2025-04-03 ENCOUNTER — APPOINTMENT (OUTPATIENT)
Facility: CLINIC | Age: 60
End: 2025-04-03
Payer: COMMERCIAL

## 2025-04-03 VITALS
OXYGEN SATURATION: 95 % | DIASTOLIC BLOOD PRESSURE: 81 MMHG | RESPIRATION RATE: 16 BRPM | HEART RATE: 62 BPM | SYSTOLIC BLOOD PRESSURE: 119 MMHG | HEIGHT: 69 IN | WEIGHT: 175.8 LBS | BODY MASS INDEX: 26.04 KG/M2

## 2025-04-03 DIAGNOSIS — G47.30 SLEEP DISORDER BREATHING: Primary | ICD-10-CM

## 2025-04-03 DIAGNOSIS — F17.290 CIGAR SMOKER: ICD-10-CM

## 2025-04-03 DIAGNOSIS — E66.3 OVERWEIGHT (BMI 25.0-29.9): ICD-10-CM

## 2025-04-03 PROCEDURE — 3008F BODY MASS INDEX DOCD: CPT | Performed by: GENERAL PRACTICE

## 2025-04-03 PROCEDURE — 99204 OFFICE O/P NEW MOD 45 MIN: CPT | Performed by: GENERAL PRACTICE

## 2025-04-03 ASSESSMENT — PATIENT HEALTH QUESTIONNAIRE - PHQ9
2. FEELING DOWN, DEPRESSED OR HOPELESS: SEVERAL DAYS
10. IF YOU CHECKED OFF ANY PROBLEMS, HOW DIFFICULT HAVE THESE PROBLEMS MADE IT FOR YOU TO DO YOUR WORK, TAKE CARE OF THINGS AT HOME, OR GET ALONG WITH OTHER PEOPLE: VERY DIFFICULT
SUM OF ALL RESPONSES TO PHQ9 QUESTIONS 1 AND 2: 2
1. LITTLE INTEREST OR PLEASURE IN DOING THINGS: SEVERAL DAYS

## 2025-04-03 ASSESSMENT — SLEEP AND FATIGUE QUESTIONNAIRES
HOW LIKELY ARE YOU TO NOD OFF OR FALL ASLEEP WHEN YOU ARE A PASSENGER IN A CAR FOR AN HOUR WITHOUT A BREAK: SLIGHT CHANCE OF DOZING
ESS-CHAD TOTAL SCORE: 9
SATISFACTION_WITH_CURRENT_SLEEP_PATTERN: DISSATISFIED
WAKING_TOO_EARLY: MILD
SLEEP_PROBLEM_NOTICEABLE_TO_OTHERS: VERY MUCH NOTICEABLE
HOW LIKELY ARE YOU TO NOD OFF OR FALL ASLEEP WHILE SITTING QUIETLY AFTER LUNCH WITHOUT ALCOHOL: WOULD NEVER DOZE
DIFFICULTY_STAYING_ASLEEP: MODERATE
HOW LIKELY ARE YOU TO NOD OFF OR FALL ASLEEP WHILE SITTING AND TALKING TO SOMEONE: SLIGHT CHANCE OF DOZING
HOW LIKELY ARE YOU TO NOD OFF OR FALL ASLEEP WHILE LYING DOWN TO REST IN THE AFTERNOON WHEN CIRCUMSTANCES PERMIT: SLIGHT CHANCE OF DOZING
HOW LIKELY ARE YOU TO NOD OFF OR FALL ASLEEP IN A CAR, WHILE STOPPED FOR A FEW MINUTES IN TRAFFIC: WOULD NEVER DOZE
HOW LIKELY ARE YOU TO NOD OFF OR FALL ASLEEP WHILE WATCHING TV: HIGH CHANCE OF DOZING
SLEEP_PROBLEM_INTERFERES_DAILY_ACTIVITIES: VERY MUCH NOTICEABLE
WORRIED_DISTRESSED_DUE_TO_SLEEP: SOMEWHAT
SITING INACTIVE IN A PUBLIC PLACE LIKE A CLASS ROOM OR A MOVIE THEATER: SLIGHT CHANCE OF DOZING
HOW LIKELY ARE YOU TO NOD OFF OR FALL ASLEEP WHILE SITTING AND READING: MODERATE CHANCE OF DOZING

## 2025-04-03 ASSESSMENT — COLUMBIA-SUICIDE SEVERITY RATING SCALE - C-SSRS
6. HAVE YOU EVER DONE ANYTHING, STARTED TO DO ANYTHING, OR PREPARED TO DO ANYTHING TO END YOUR LIFE?: NO
2. HAVE YOU ACTUALLY HAD ANY THOUGHTS OF KILLING YOURSELF?: NO
1. IN THE PAST MONTH, HAVE YOU WISHED YOU WERE DEAD OR WISHED YOU COULD GO TO SLEEP AND NOT WAKE UP?: NO

## 2025-04-03 NOTE — PROGRESS NOTES
Patient: Pancho King    [id number]  : 1965 -- AGE 60 y.o.    Provider: Anibal Epps DO     [facility]   Service Date: 4/3/2025              [facility]  New Visit Note      HISTORY OF PRESENT ILLNESS     The patient's referring provider is: Dr. Franklin     HISTORY OF PRESENT ILLNESS   Pancho King is a 60 y.o. male who presents to a [facility] for a sleep medicine evaluation with concerns of Snoring.     The patient  has a past medical history of Contusion of right shoulder, initial encounter (2019), Low back pain, unspecified (2019), Other muscle spasm (2020), Other shoulder lesions, right shoulder (2019), Other specified joint disorders, right shoulder (2019), Personal history of other diseases of the respiratory system (2016), Personal history of other diseases of the respiratory system (03/15/2018), Personal history of other diseases of the respiratory system (2016), Personal history of other diseases of the respiratory system (2019), Personal history of other specified conditions (2019), Personal history of other specified conditions, Personal history of other specified conditions (10/25/2018), Strain of muscle(s) and tendon(s) of the rotator cuff of right shoulder, initial encounter (2019), and Strain of muscle, fascia and tendon of other parts of biceps, right arm, initial encounter (2019)..    PAST SLEEP HISTORY    Pmhx includes anxiety/ depression, allergic rhinitis/ seasonal allergies, cigar smoker, arthritis.     Around , patient had a sleep study done due to snoring. He was found to have sleep apnea and was started on pap therapy. He used pap therapy for a few month then it fell and broke.     CURRENT HISTORY    On today's visit, 4/3/2025, the patient reports that he is establishing care for his sleep apnea.    Sleep schedule   on weekdays / work days:  Usual Bedtime: 9pm  Sleep latency: few min  Wake time : 5am  Total sleep time average/day: 6-7 hours/day  Awakenings: 4-5x per night, nocturia/ TV, short.   Naps: sometimes dozes off while sitting in a recliner watching TV.     Sleep schedule  on weekends/non work days :  Usual Bedtime:   10pm  Wake time : 6    Sleep aids: no  Stimulants: no    Occupation: .     Preferred sleeping position: SLEEP POSITION: sidelying    Sleep-related ROS:    Snoring:  y  Witnessed apneas: y       Gasping/ choking: y     Am Dry mouth: n           Nasal congestion:   sometimes      am headaches: sometimes    Sleep is described as un-refreshing.     Daytime sleepiness: sometimes  Fatigue or decreased energy: sometimes  Difficulty remembering things in daytime: sometimes  Difficulty staying focused in daytime: : sometimes  Irritable during the day: sometimes    Drowsy driving: n  Hx of car accident: n  Near-miss Car accident: n      RLS screen:  RLSSCREEN: - Sensations: Patient does not have unusual sensations in their extremities that cause an urge to move them   Gets numbness but he states that he had back problems.     Sleep-related behaviors: DENIES    ESS: 9       REVIEW OF SYSTEMS     REVIEW OF SYSTEMS  Review of Systems   All other systems reviewed and are negative.      ALLERGIES AND MEDICATIONS     ALLERGIES  Allergies   Allergen Reactions    Animal Dander Unknown       MEDICATIONS  Current Outpatient Medications   Medication Sig Dispense Refill    aspirin-calcium carbonate 81 mg-300 mg calcium(777 mg) tablet Take 1 tablet by mouth once daily.      atorvastatin (Lipitor) 80 mg tablet TAKE 1 TABLET BY MOUTH ONCE DAILY 90 tablet 2    FLUoxetine (PROzac) 40 mg capsule TAKE 1 CAPSULE BY MOUTH ONCE DAILY 90 capsule 2    naproxen sodium (Aleve) 220 mg tablet Take 1 tablet (220 mg) by mouth once daily.      pantoprazole (ProtoNix) 40 mg EC tablet TAKE ONE TABLET BY MOUTH EVERY DAY 90 tablet 3     sildenafil (Viagra) 100 mg tablet Take one tablet by mouth 1 hour before intercourse as needed 10 tablet 1    testosterone 1 % (50 mg/5 gram) gel in packet PLACE 1 PACKET ON THE SKIN ONCE DAILY AS DIRECTED. 150 g 0    testosterone 50 mg/5 gram (1 %) gel APPLY 1 PACKET ONE TIME DAILY AS DIRECTED 150 g 1     No current facility-administered medications for this visit.         PAST HISTORY     PAST MEDICAL HISTORY  He  has a past medical history of Contusion of right shoulder, initial encounter (05/09/2019), Low back pain, unspecified (11/11/2019), Other muscle spasm (03/12/2020), Other shoulder lesions, right shoulder (01/31/2019), Other specified joint disorders, right shoulder (05/09/2019), Personal history of other diseases of the respiratory system (09/19/2016), Personal history of other diseases of the respiratory system (03/15/2018), Personal history of other diseases of the respiratory system (09/19/2016), Personal history of other diseases of the respiratory system (05/09/2019), Personal history of other specified conditions (05/09/2019), Personal history of other specified conditions, Personal history of other specified conditions (10/25/2018), Strain of muscle(s) and tendon(s) of the rotator cuff of right shoulder, initial encounter (05/09/2019), and Strain of muscle, fascia and tendon of other parts of biceps, right arm, initial encounter (05/09/2019).      PAST SURGICAL HISTORY:  Past Surgical History:   Procedure Laterality Date    MR HEAD ANGIO WO IV CONTRAST  4/19/2019    MR HEAD ANGIO WO IV CONTRAST 4/19/2019 [facility]    MR NECK ANGIO WO IV CONTRAST  4/19/2019    MR NECK ANGIO WO IV CONTRAST 4/19/2019 [facility]    VASECTOMY  05/20/2014    Surgery Vas Deferens Vasectomy       FAMILY HISTORY  Family History   Problem Relation Name Age of Onset    Arthritis Mother      Other (CVA) Mother      Alcohol abuse Father      Alcohol abuse Sister      Alcohol abuse Brother      Depression  "Brother      Arthritis Brother       DOES/DOES NOT EC: does not have a family history of sleep disorder.      SOCIAL HISTORY  He  reports that he has been smoking cigars. He has never used smokeless tobacco. He reports current alcohol use. He reports current drug use. Drug: Marijuana.     Caffeine consumption: 2 pots of coffee throughout the day and tea  Alcohol consumption: on the weekends  Smoking: cigars, 1/2 cigar per day  Marijuana: sometimes gummies to help with anxiety.   Other drugs: no      PHYSICAL EXAM     VITAL SIGNS: /81   Pulse 62   Resp 16   Ht 1.753 m (5' 9\")   Wt 79.7 kg (175 lb 12.8 oz)   SpO2 95%   BMI 25.96 kg/m²      PREVIOUS WEIGHTS:  Wt Readings from Last 3 Encounters:   04/03/25 79.7 kg (175 lb 12.8 oz)   02/26/25 80.7 kg (178 lb)   05/02/24 78.5 kg (173 lb)       Physical Exam  Constitutional: Alert and oriented, cooperative, no obvious distress.   HENT: normocephalic.   Eyes: PERRLA, nonicteric   Neck: Supple, trachea midline   respiratory: CTA bilaterally, no wheezing/ crackles/ cough  Cardiac: no rub/ gallops  GI:BS in all 4 quadrants, Soft, nontender, no masses  musculoskeletal/ Extremities: No clubbing  integumentary: no significant rashes observed.   Neurologic: AOx3.   psychiatric: appropriate mood and affect.  Modified Mallampati: 3    RESULTS/DATA     Iron (ug/dL)   Date Value   08/10/2023 124     Iron Saturation (%)   Date Value   08/10/2023 26     TIBC (ug/dL)   Date Value   08/10/2023 483 (H)     Ferritin (ug/L)   Date Value   08/10/2023 22               ASSESSMENT/PLAN     Mr. King is a 60 y.o. male and  has a past medical history of Contusion of right shoulder, initial encounter (05/09/2019), Low back pain, unspecified (11/11/2019), Other muscle spasm (03/12/2020), Other shoulder lesions, right shoulder (01/31/2019), Other specified joint disorders, right shoulder (05/09/2019), Personal history of other diseases of the respiratory system (09/19/2016), Personal " history of other diseases of the respiratory system (03/15/2018), Personal history of other diseases of the respiratory system (09/19/2016), Personal history of other diseases of the respiratory system (05/09/2019), Personal history of other specified conditions (05/09/2019), Personal history of other specified conditions, Personal history of other specified conditions (10/25/2018), Strain of muscle(s) and tendon(s) of the rotator cuff of right shoulder, initial encounter (05/09/2019), and Strain of muscle, fascia and tendon of other parts of biceps, right arm, initial encounter (05/09/2019). He was referred to the [facility] for evaluation of sleep apnea.     Problem List Items Addressed This Visit    None      Problem List and Orders  Pmhx includes anxiety/ depression, allergic rhinitis/ seasonal allergies, cigar smoker, arthritis.     1- sleep disorder breathing, hx of sleep apnea.     Symptoms include snoring, night time awakenings, nocturia, un-refreshing sleep.     -do not drive or operate heavy machinery if drowsy.  -avoid sleeping on your back.   -avoid sedating substances/ medication, alcohol, illicit drugs and tobacco.    2- Overweight   counseled on eating a healthy diet and exercising as tolerated.    3- cigar smoker  1/2 cigar per day  Smoking cessation recommended.     Follow up after sleep study or sooner as needed.

## 2025-04-04 LAB
ALBUMIN SERPL-MCNC: 4.7 G/DL (ref 3.6–5.1)
ALP SERPL-CCNC: 66 U/L (ref 35–144)
ALT SERPL-CCNC: 24 U/L (ref 9–46)
ANION GAP SERPL CALCULATED.4IONS-SCNC: 9 MMOL/L (CALC) (ref 7–17)
AST SERPL-CCNC: 22 U/L (ref 10–35)
BASOPHILS # BLD AUTO: 68 CELLS/UL (ref 0–200)
BASOPHILS NFR BLD AUTO: 1.2 %
BILIRUB SERPL-MCNC: 0.7 MG/DL (ref 0.2–1.2)
BUN SERPL-MCNC: 15 MG/DL (ref 7–25)
CALCIUM SERPL-MCNC: 10.1 MG/DL (ref 8.6–10.3)
CHLORIDE SERPL-SCNC: 104 MMOL/L (ref 98–110)
CHOLEST SERPL-MCNC: 204 MG/DL
CHOLEST/HDLC SERPL: 4.2 (CALC)
CO2 SERPL-SCNC: 28 MMOL/L (ref 20–32)
CREAT SERPL-MCNC: 0.93 MG/DL (ref 0.7–1.35)
EGFRCR SERPLBLD CKD-EPI 2021: 93 ML/MIN/1.73M2
EOSINOPHIL # BLD AUTO: 80 CELLS/UL (ref 15–500)
EOSINOPHIL NFR BLD AUTO: 1.4 %
ERYTHROCYTE [DISTWIDTH] IN BLOOD BY AUTOMATED COUNT: 12.8 % (ref 11–15)
GLUCOSE SERPL-MCNC: 89 MG/DL (ref 65–139)
HCT VFR BLD AUTO: 43 % (ref 38.5–50)
HDLC SERPL-MCNC: 49 MG/DL
HGB BLD-MCNC: 14.6 G/DL (ref 13.2–17.1)
LDLC SERPL CALC-MCNC: 133 MG/DL (CALC)
LYMPHOCYTES # BLD AUTO: 1995 CELLS/UL (ref 850–3900)
LYMPHOCYTES NFR BLD AUTO: 35 %
MCH RBC QN AUTO: 31.7 PG (ref 27–33)
MCHC RBC AUTO-ENTMCNC: 34 G/DL (ref 32–36)
MCV RBC AUTO: 93.3 FL (ref 80–100)
MONOCYTES # BLD AUTO: 502 CELLS/UL (ref 200–950)
MONOCYTES NFR BLD AUTO: 8.8 %
NEUTROPHILS # BLD AUTO: 3055 CELLS/UL (ref 1500–7800)
NEUTROPHILS NFR BLD AUTO: 53.6 %
NONHDLC SERPL-MCNC: 155 MG/DL (CALC)
PLATELET # BLD AUTO: 129 THOUSAND/UL (ref 140–400)
PMV BLD REES-ECKER: 13.6 FL (ref 7.5–12.5)
POTASSIUM SERPL-SCNC: 4.3 MMOL/L (ref 3.5–5.3)
PROT SERPL-MCNC: 7.3 G/DL (ref 6.1–8.1)
PSA SERPL-MCNC: 1.91 NG/ML
RBC # BLD AUTO: 4.61 MILLION/UL (ref 4.2–5.8)
SODIUM SERPL-SCNC: 141 MMOL/L (ref 135–146)
TESTOST FREE SERPL-MCNC: NORMAL PG/ML
TESTOST SERPL-MCNC: NORMAL NG/DL
TRIGL SERPL-MCNC: 116 MG/DL
WBC # BLD AUTO: 5.7 THOUSAND/UL (ref 3.8–10.8)

## 2025-04-07 LAB
ALBUMIN SERPL-MCNC: 4.7 G/DL (ref 3.6–5.1)
ALP SERPL-CCNC: 66 U/L (ref 35–144)
ALT SERPL-CCNC: 24 U/L (ref 9–46)
ANION GAP SERPL CALCULATED.4IONS-SCNC: 9 MMOL/L (CALC) (ref 7–17)
AST SERPL-CCNC: 22 U/L (ref 10–35)
BASOPHILS # BLD AUTO: 68 CELLS/UL (ref 0–200)
BASOPHILS NFR BLD AUTO: 1.2 %
BILIRUB SERPL-MCNC: 0.7 MG/DL (ref 0.2–1.2)
BUN SERPL-MCNC: 15 MG/DL (ref 7–25)
CALCIUM SERPL-MCNC: 10.1 MG/DL (ref 8.6–10.3)
CHLORIDE SERPL-SCNC: 104 MMOL/L (ref 98–110)
CHOLEST SERPL-MCNC: 204 MG/DL
CHOLEST/HDLC SERPL: 4.2 (CALC)
CO2 SERPL-SCNC: 28 MMOL/L (ref 20–32)
CREAT SERPL-MCNC: 0.93 MG/DL (ref 0.7–1.35)
EGFRCR SERPLBLD CKD-EPI 2021: 93 ML/MIN/1.73M2
EOSINOPHIL # BLD AUTO: 80 CELLS/UL (ref 15–500)
EOSINOPHIL NFR BLD AUTO: 1.4 %
ERYTHROCYTE [DISTWIDTH] IN BLOOD BY AUTOMATED COUNT: 12.8 % (ref 11–15)
GLUCOSE SERPL-MCNC: 89 MG/DL (ref 65–139)
HCT VFR BLD AUTO: 43 % (ref 38.5–50)
HDLC SERPL-MCNC: 49 MG/DL
HGB BLD-MCNC: 14.6 G/DL (ref 13.2–17.1)
LDLC SERPL CALC-MCNC: 133 MG/DL (CALC)
LYMPHOCYTES # BLD AUTO: 1995 CELLS/UL (ref 850–3900)
LYMPHOCYTES NFR BLD AUTO: 35 %
MCH RBC QN AUTO: 31.7 PG (ref 27–33)
MCHC RBC AUTO-ENTMCNC: 34 G/DL (ref 32–36)
MCV RBC AUTO: 93.3 FL (ref 80–100)
MONOCYTES # BLD AUTO: 502 CELLS/UL (ref 200–950)
MONOCYTES NFR BLD AUTO: 8.8 %
NEUTROPHILS # BLD AUTO: 3055 CELLS/UL (ref 1500–7800)
NEUTROPHILS NFR BLD AUTO: 53.6 %
NONHDLC SERPL-MCNC: 155 MG/DL (CALC)
PLATELET # BLD AUTO: 129 THOUSAND/UL (ref 140–400)
PMV BLD REES-ECKER: 13.6 FL (ref 7.5–12.5)
POTASSIUM SERPL-SCNC: 4.3 MMOL/L (ref 3.5–5.3)
PROT SERPL-MCNC: 7.3 G/DL (ref 6.1–8.1)
PSA SERPL-MCNC: 1.91 NG/ML
RBC # BLD AUTO: 4.61 MILLION/UL (ref 4.2–5.8)
SODIUM SERPL-SCNC: 141 MMOL/L (ref 135–146)
TESTOST FREE SERPL-MCNC: 52.1 PG/ML (ref 35–155)
TESTOST SERPL-MCNC: 404 NG/DL (ref 250–1100)
TRIGL SERPL-MCNC: 116 MG/DL
WBC # BLD AUTO: 5.7 THOUSAND/UL (ref 3.8–10.8)

## 2025-04-16 ENCOUNTER — PROCEDURE VISIT (OUTPATIENT)
Dept: SLEEP MEDICINE | Facility: HOSPITAL | Age: 60
End: 2025-04-16
Payer: COMMERCIAL

## 2025-04-16 DIAGNOSIS — G47.30 SLEEP DISORDER BREATHING: ICD-10-CM

## 2025-04-16 PROCEDURE — 95806 SLEEP STUDY UNATT&RESP EFFT: CPT | Performed by: PSYCHIATRY & NEUROLOGY

## 2025-05-02 ENCOUNTER — APPOINTMENT (OUTPATIENT)
Dept: PRIMARY CARE | Facility: CLINIC | Age: 60
End: 2025-05-02
Payer: COMMERCIAL

## 2025-05-02 VITALS
RESPIRATION RATE: 16 BRPM | DIASTOLIC BLOOD PRESSURE: 80 MMHG | HEART RATE: 62 BPM | SYSTOLIC BLOOD PRESSURE: 148 MMHG | HEIGHT: 67 IN | TEMPERATURE: 97.5 F | BODY MASS INDEX: 27.15 KG/M2 | OXYGEN SATURATION: 97 % | WEIGHT: 173 LBS

## 2025-05-02 DIAGNOSIS — E78.2 MIXED HYPERLIPIDEMIA: ICD-10-CM

## 2025-05-02 DIAGNOSIS — Z00.00 HEALTHCARE MAINTENANCE: Primary | ICD-10-CM

## 2025-05-02 DIAGNOSIS — E29.1 HYPOGONADISM IN MALE: ICD-10-CM

## 2025-05-02 PROCEDURE — 99396 PREV VISIT EST AGE 40-64: CPT | Performed by: INTERNAL MEDICINE

## 2025-05-02 PROCEDURE — 3008F BODY MASS INDEX DOCD: CPT | Performed by: INTERNAL MEDICINE

## 2025-05-02 PROCEDURE — 93000 ELECTROCARDIOGRAM COMPLETE: CPT | Performed by: INTERNAL MEDICINE

## 2025-05-02 RX ORDER — FLUOXETINE HYDROCHLORIDE 20 MG/1
20 CAPSULE ORAL DAILY
COMMUNITY

## 2025-05-02 RX ORDER — ATORVASTATIN CALCIUM 80 MG/1
80 TABLET, FILM COATED ORAL DAILY
Qty: 90 TABLET | Refills: 3 | Status: SHIPPED | OUTPATIENT
Start: 2025-05-02

## 2025-05-02 RX ORDER — SILDENAFIL 100 MG/1
TABLET, FILM COATED ORAL
Qty: 10 TABLET | Refills: 1 | Status: SHIPPED | OUTPATIENT
Start: 2025-05-02

## 2025-05-02 ASSESSMENT — ENCOUNTER SYMPTOMS
DIARRHEA: 0
ABDOMINAL PAIN: 0
CONSTIPATION: 0

## 2025-05-02 NOTE — PROGRESS NOTES
Patient here for a physical     Subjective   Patient ID: Pancho King is a 60 y.o. male who presents for Annual Exam.    The patient was recently increased on fluoxetine to 60mg once daily for about one week now, and is tolerating this dosage well.  He is now following with a new Psychologist, and states that the regular sessions are helping.    The patient is pleased to report a weight loss of 5lbs since 2/2025 which he attributes to improved dietary habits.    The patient takes atorvastatin 80mg once daily in the mornings but admits he will forget doses.    The patient was recently diagnosed with moderate to severe obstructive sleep apnea.  He is awaiting an upcoming appointment with  from Sleep Medicine in 7/2025, and suspects he will be prescribed CPAP therapy at that time.    The patient denies any hearing impairment or vision changes, and completed the last eye exam two year prior.  He also denies any abdominal pain or bowel problems.      Review of Systems   HENT:  Negative for hearing loss.    Eyes:  Negative for visual disturbance.   Gastrointestinal:  Negative for abdominal pain, constipation and diarrhea.   All other systems reviewed and are negative.      Objective   Physical Exam  Constitutional:       Appearance: Normal appearance.   Neck:      Vascular: No carotid bruit.   Cardiovascular:      Rate and Rhythm: Normal rate and regular rhythm.      Heart sounds: Normal heart sounds.   Pulmonary:      Effort: Pulmonary effort is normal.      Breath sounds: Normal breath sounds.   Abdominal:      General: Bowel sounds are normal.      Palpations: Abdomen is soft.      Tenderness: There is no abdominal tenderness.   Skin:     General: Skin is warm and dry.   Neurological:      General: No focal deficit present.      Mental Status: He is alert and oriented to person, place, and time. Mental status is at baseline.   Psychiatric:         Mood and Affect: Mood normal.         Behavior: Behavior  normal.         Assessment/Plan   Problem List Items Addressed This Visit           ICD-10-CM    Hypogonadism in male E29.1    Relevant Medications    sildenafil (Viagra) 100 mg tablet    Healthcare maintenance - Primary Z00.00    Relevant Orders    ECG 12 lead (Clinic Performed) (Completed)     Other Visit Diagnoses         Codes      Mixed hyperlipidemia     E78.2    Relevant Medications    atorvastatin (Lipitor) 80 mg tablet            CPE Performed  -  Discussed healthy diet and regular exercise.    -  Physical exam overall unremarkable. Immunizations reviewed and updated accordingly. Healthy lifestyle choices discussed (tobacco avoidance, appropriate alcohol use, avoidance of illicit substances).   -  Patient is wearing seatbelt.   -  Screening lab work ordered as indicated.    -  Age appropriate screening tests reviewed with patient.        EKG unremarkable as compared to previous.    IMPRESSIONS/PLAN:      Hypercholesterolemia  - Cholesterol, LDL, and TG borderline high 4/2025.  Continue on atorvastatin 80mg QD, and advised patient to take medication at night time.  Patient verbalized agreement.    /80 in the office today.  Patient had coffee this morning.  Instructed patient regarding proper measuring protocol.  Advised patient to check blood pressure at home regularly, and call the clinic if persistently elevated above 140/90 mmHg.     Depression   - Stable. Maintained with fluoxetine 60mg every day.   Previously on bupropion 150mg XL BID.     History TIA   - Stable.      ANABEL  - Following with  from Sleep Medicine.     Tobacco Use  - CT Lung Screening 5/2023 showed no suspicious pulmonary nodules.  Stable per repeat CT Lung Screening 5/2024.  Previously ordered repeat CT Lung Screening for 2025.    GERD   - Continue on pantoprazole 40mg QD as needed. Last EGD 10/2022.     Testicular Hypofunction  - Last Testosterone Free and Total wnl 4/2025.  Continue on Androgel 50mg/5gm as prescribed.      ED   - Continue on sildenafil 100mg 1 hour before sexual activity.  Advised patient to reduce dosage to 0.5 tablet, and he verbalized agreement.       Health Maintenance   - Routine labs 4/2025. Last PSA wnl 4/2025. Last colonoscopy 10/2022, due for repeat 10/2027.  Patient has personal history of serrated polyposis syndrome.     Follow-up according to routine health maintenance, call sooner if needed.        Scribe Attestation  By signing my name below, I, Ravi Dalton   attest that this documentation has been prepared under the direction and in the presence of Mark Franklin DO.   Zuri Ahn 05/02/25 8:04 AM

## 2025-05-06 ENCOUNTER — APPOINTMENT (OUTPATIENT)
Dept: SLEEP MEDICINE | Facility: CLINIC | Age: 60
End: 2025-05-06
Payer: COMMERCIAL

## 2025-05-29 ENCOUNTER — HOSPITAL ENCOUNTER (OUTPATIENT)
Dept: RADIOLOGY | Facility: CLINIC | Age: 60
Discharge: HOME | End: 2025-05-29
Payer: COMMERCIAL

## 2025-05-29 DIAGNOSIS — Z72.0 TOBACCO ABUSE: ICD-10-CM

## 2025-05-29 PROCEDURE — 71271 CT THORAX LUNG CANCER SCR C-: CPT

## 2025-07-07 ENCOUNTER — APPOINTMENT (OUTPATIENT)
Facility: CLINIC | Age: 60
End: 2025-07-07
Payer: COMMERCIAL

## 2025-07-07 VITALS
HEART RATE: 63 BPM | DIASTOLIC BLOOD PRESSURE: 69 MMHG | SYSTOLIC BLOOD PRESSURE: 150 MMHG | WEIGHT: 169 LBS | OXYGEN SATURATION: 98 % | TEMPERATURE: 97.7 F | BODY MASS INDEX: 26.53 KG/M2 | HEIGHT: 67 IN

## 2025-07-07 DIAGNOSIS — F17.290 CIGAR SMOKER: ICD-10-CM

## 2025-07-07 DIAGNOSIS — G47.33 OSA (OBSTRUCTIVE SLEEP APNEA): Primary | ICD-10-CM

## 2025-07-07 DIAGNOSIS — E66.3 OVERWEIGHT (BMI 25.0-29.9): ICD-10-CM

## 2025-07-07 PROCEDURE — 3008F BODY MASS INDEX DOCD: CPT | Performed by: GENERAL PRACTICE

## 2025-07-07 PROCEDURE — 99214 OFFICE O/P EST MOD 30 MIN: CPT | Performed by: GENERAL PRACTICE

## 2025-07-07 ASSESSMENT — SLEEP AND FATIGUE QUESTIONNAIRES
HOW LIKELY ARE YOU TO NOD OFF OR FALL ASLEEP WHILE SITTING QUIETLY AFTER LUNCH WITHOUT ALCOHOL: SLIGHT CHANCE OF DOZING
ESS-CHAD TOTAL SCORE: 8
HOW LIKELY ARE YOU TO NOD OFF OR FALL ASLEEP WHILE SITTING AND TALKING TO SOMEONE: SLIGHT CHANCE OF DOZING
HOW LIKELY ARE YOU TO NOD OFF OR FALL ASLEEP WHILE WATCHING TV: MODERATE CHANCE OF DOZING
HOW LIKELY ARE YOU TO NOD OFF OR FALL ASLEEP WHILE LYING DOWN TO REST IN THE AFTERNOON WHEN CIRCUMSTANCES PERMIT: SLIGHT CHANCE OF DOZING
HOW LIKELY ARE YOU TO NOD OFF OR FALL ASLEEP IN A CAR, WHILE STOPPED FOR A FEW MINUTES IN TRAFFIC: WOULD NEVER DOZE
SITING INACTIVE IN A PUBLIC PLACE LIKE A CLASS ROOM OR A MOVIE THEATER: WOULD NEVER DOZE
HOW LIKELY ARE YOU TO NOD OFF OR FALL ASLEEP WHEN YOU ARE A PASSENGER IN A CAR FOR AN HOUR WITHOUT A BREAK: SLIGHT CHANCE OF DOZING
HOW LIKELY ARE YOU TO NOD OFF OR FALL ASLEEP WHILE SITTING AND READING: MODERATE CHANCE OF DOZING

## 2025-07-07 ASSESSMENT — PAIN SCALES - GENERAL: PAINLEVEL_OUTOF10: 6

## 2025-07-07 NOTE — PROGRESS NOTES
Patient: Pancho King    [id number]  : 1965 -- AGE 60 y.o.    Provider: Anibal Epps DO     Location [facility]   Service Date: 2025              [facility]  Follow up Visit Note      HISTORY OF PRESENT ILLNESS     The patient's referring provider is: Dr. Franklin     HISTORY OF PRESENT ILLNESS   Pancho King is a 60 y.o. male who presents to a [facility] for a sleep medicine evaluation with concerns of Sleeping Problem (Follow up).     The patient  has a past medical history of Contusion of right shoulder, initial encounter (2019), GERD (gastroesophageal reflux disease), Low back pain, unspecified (2019), Other muscle spasm (2020), Other shoulder lesions, right shoulder (2019), Other specified joint disorders, right shoulder (2019), Personal history of other diseases of the respiratory system (2016), Personal history of other diseases of the respiratory system (03/15/2018), Personal history of other diseases of the respiratory system (2016), Personal history of other diseases of the respiratory system (2019), Personal history of other specified conditions (2019), Personal history of other specified conditions, Personal history of other specified conditions (10/25/2018), Sleep apnea, obstructive, Snoring, Strain of muscle(s) and tendon(s) of the rotator cuff of right shoulder, initial encounter (2019), and Strain of muscle, fascia and tendon of other parts of biceps, right arm, initial encounter (2019)..    PAST SLEEP HISTORY    Pmhx includes anxiety/ depression, allergic rhinitis/ seasonal allergies, cigar smoker, arthritis.     Around , patient had a sleep study done due to snoring. He was found to have sleep apnea and was started on pap therapy. He used pap therapy for a few month then it fell and broke.     CURRENT HISTORY    4/3/25 the  patient reports that he is establishing care for his sleep apnea.    7/7/25  Patient had a sleep study done and would like to discuss his results.     Sleep schedule  on weekdays / work days:  Usual Bedtime: 9 pm  Sleep latency: few min  Wake time : 5am  Total sleep time average/day: 6-7 hours/day  Awakenings: 1-2x per night, nocturia/ TV, short.   Naps: sometimes dozes off while sitting in a recliner watching TV.     Sleep schedule  on weekends/non work days :  Usual Bedtime:   9 pm  Wake time : 6    Sleep aids: no  Stimulants: no    Occupation: .     Preferred sleeping position: SLEEP POSITION: sidelying    Sleep-related ROS:    Snoring:  y  Witnessed apneas: y       Gasping/ choking: y     Am Dry mouth: n           Nasal congestion:   sometimes      am headaches: sometimes    Sleep is described as un-refreshing.     Daytime sleepiness: sometimes  Fatigue or decreased energy: sometimes  Difficulty remembering things in daytime: sometimes  Difficulty staying focused in daytime: : sometimes  Irritable during the day: sometimes    Drowsy driving: n  Hx of car accident: n  Near-miss Car accident: n      RLS screen:  RLSSCREEN: - Sensations: Patient does not have unusual sensations in their extremities that cause an urge to move them   Gets numbness but he states that he had back problems.     Sleep-related behaviors: DENIES    ESS: 8       REVIEW OF SYSTEMS     REVIEW OF SYSTEMS  Review of Systems   All other systems reviewed and are negative.      ALLERGIES AND MEDICATIONS     ALLERGIES  Allergies   Allergen Reactions    Animal Dander Unknown       MEDICATIONS  Current Outpatient Medications   Medication Sig Dispense Refill    aspirin-calcium carbonate 81 mg-300 mg calcium(777 mg) tablet Take 1 tablet by mouth once daily.      atorvastatin (Lipitor) 80 mg tablet Take 1 tablet (80 mg) by mouth once daily. 90 tablet 3    FLUoxetine (PROzac) 20 mg capsule Take 1 capsule (20 mg) by mouth once daily.       FLUoxetine (PROzac) 40 mg capsule TAKE 1 CAPSULE BY MOUTH ONCE DAILY 90 capsule 2    naproxen sodium (Aleve) 220 mg tablet Take 1 tablet (220 mg) by mouth once daily.      pantoprazole (ProtoNix) 40 mg EC tablet TAKE ONE TABLET BY MOUTH EVERY DAY 90 tablet 3    sildenafil (Viagra) 100 mg tablet Take one tablet by mouth 1 hour before intercourse as needed 10 tablet 1    testosterone 1 % (50 mg/5 gram) gel in packet PLACE 1 PACKET ON THE SKIN ONCE DAILY AS DIRECTED. 150 g 0    testosterone 50 mg/5 gram (1 %) gel APPLY 1 PACKET ONE TIME DAILY AS DIRECTED 150 g 1     No current facility-administered medications for this visit.         PAST HISTORY     PAST MEDICAL HISTORY  He  has a past medical history of Contusion of right shoulder, initial encounter (05/09/2019), GERD (gastroesophageal reflux disease), Low back pain, unspecified (11/11/2019), Other muscle spasm (03/12/2020), Other shoulder lesions, right shoulder (01/31/2019), Other specified joint disorders, right shoulder (05/09/2019), Personal history of other diseases of the respiratory system (09/19/2016), Personal history of other diseases of the respiratory system (03/15/2018), Personal history of other diseases of the respiratory system (09/19/2016), Personal history of other diseases of the respiratory system (05/09/2019), Personal history of other specified conditions (05/09/2019), Personal history of other specified conditions, Personal history of other specified conditions (10/25/2018), Sleep apnea, obstructive, Snoring, Strain of muscle(s) and tendon(s) of the rotator cuff of right shoulder, initial encounter (05/09/2019), and Strain of muscle, fascia and tendon of other parts of biceps, right arm, initial encounter (05/09/2019).      PAST SURGICAL HISTORY:  Past Surgical History:   Procedure Laterality Date    MR HEAD ANGIO WO IV CONTRAST  4/19/2019    MR HEAD ANGIO WO IV CONTRAST 4/19/2019 [facility]    MR NECK ANGIO WO IV CONTRAST  4/19/2019  "   MR NECK ANGIO WO IV CONTRAST 4/19/2019 [facility]    ORTHODONTIC TREATMENT      VASECTOMY  05/20/2014    Surgery Vas Deferens Vasectomy       FAMILY HISTORY  Family History   Problem Relation Name Age of Onset    Arthritis Mother      Other (CVA) Mother      Alcohol abuse Father      Alcohol abuse Sister      Alcohol abuse Brother      Depression Brother      Arthritis Brother       DOES/DOES NOT EC: does not have a family history of sleep disorder.      SOCIAL HISTORY  He  reports that he has been smoking cigars. He has never used smokeless tobacco. He reports current alcohol use. He reports current drug use. Drug: Marijuana.     Caffeine consumption: 2 small pots of coffee throughout the day and tea  Alcohol consumption: on the weekends  Smoking: cigars, 1/2 cigar per day  Marijuana: sometimes gummies to help with anxiety.   Other drugs: no      PHYSICAL EXAM     VITAL SIGNS: /69   Pulse 63   Temp 36.5 °C (97.7 °F) (Temporal)   Ht 1.702 m (5' 7\")   Wt 76.7 kg (169 lb)   SpO2 98%   BMI 26.47 kg/m²      PREVIOUS WEIGHTS:  Wt Readings from Last 3 Encounters:   07/07/25 76.7 kg (169 lb)   05/02/25 78.5 kg (173 lb)   04/03/25 79.7 kg (175 lb 12.8 oz)       Physical Exam  Constitutional: Alert and oriented, cooperative, no obvious distress.   HENT: normocephalic.   Neurologic: AOx3.   psychiatric: appropriate mood and affect.  Integumentary: no significant rashes observed over pap mask area.     RESULTS/DATA     Iron (ug/dL)   Date Value   08/10/2023 124     Iron Saturation (%)   Date Value   08/10/2023 26     TIBC (ug/dL)   Date Value   08/10/2023 483 (H)     Ferritin (ug/L)   Date Value   08/10/2023 22               ASSESSMENT/PLAN     Mr. King is a 60 y.o. male and  has a past medical history of Contusion of right shoulder, initial encounter (05/09/2019), GERD (gastroesophageal reflux disease), Low back pain, unspecified (11/11/2019), Other muscle spasm (03/12/2020), Other shoulder " lesions, right shoulder (01/31/2019), Other specified joint disorders, right shoulder (05/09/2019), Personal history of other diseases of the respiratory system (09/19/2016), Personal history of other diseases of the respiratory system (03/15/2018), Personal history of other diseases of the respiratory system (09/19/2016), Personal history of other diseases of the respiratory system (05/09/2019), Personal history of other specified conditions (05/09/2019), Personal history of other specified conditions, Personal history of other specified conditions (10/25/2018), Sleep apnea, obstructive, Snoring, Strain of muscle(s) and tendon(s) of the rotator cuff of right shoulder, initial encounter (05/09/2019), and Strain of muscle, fascia and tendon of other parts of biceps, right arm, initial encounter (05/09/2019). He is following up on his sleep study results.    Problem List Items Addressed This Visit    None      Problem List and Orders  Pmhx includes anxiety/ depression, allergic rhinitis/ seasonal allergies, cigar smoker, arthritis.     1- ANABEL  Symptoms include snoring, night time awakenings, nocturia, un-refreshing sleep.     HST 4/16/25 --> severe ANABEL, AHI 3% 40.5, AHI 4% 31.6, SpO2 fabiola 83.3%.     Reviewed and discussed the above sleep study results and management options in details. All questions answered, patient verbalizes understanding.     -start Autopap 5-15cwp.  DME MSC, patient will let us know if there are any issues.    -do not drive or operate heavy machinery if drowsy.  -avoid sleeping on your back.   -avoid sedating substances/ medication, alcohol, illicit drugs and tobacco.    2- Overweight   counseled on eating a healthy diet and exercising as tolerated.    3- cigar smoker  1/2 cigar per day  Smoking cessation recommended.     Follow up in 3 months or sooner as needed.

## 2025-08-20 DIAGNOSIS — E29.1 HYPOGONADISM IN MALE: ICD-10-CM

## 2025-08-20 RX ORDER — TESTOSTERONE GEL, 1% 10 MG/G
GEL TRANSDERMAL
Qty: 150 G | Refills: 0 | Status: SHIPPED | OUTPATIENT
Start: 2025-08-20

## 2025-10-17 ENCOUNTER — APPOINTMENT (OUTPATIENT)
Facility: CLINIC | Age: 60
End: 2025-10-17
Payer: COMMERCIAL

## 2025-11-05 ENCOUNTER — APPOINTMENT (OUTPATIENT)
Dept: PRIMARY CARE | Facility: CLINIC | Age: 60
End: 2025-11-05
Payer: COMMERCIAL